# Patient Record
Sex: MALE | Race: BLACK OR AFRICAN AMERICAN
[De-identification: names, ages, dates, MRNs, and addresses within clinical notes are randomized per-mention and may not be internally consistent; named-entity substitution may affect disease eponyms.]

---
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
## 2021-04-15 ENCOUNTER — HOSPITAL ENCOUNTER (EMERGENCY)
Dept: HOSPITAL 47 - EC | Age: 21
Discharge: HOME | End: 2021-04-15
Payer: COMMERCIAL

## 2021-04-15 VITALS
SYSTOLIC BLOOD PRESSURE: 134 MMHG | RESPIRATION RATE: 18 BRPM | TEMPERATURE: 98 F | HEART RATE: 55 BPM | DIASTOLIC BLOOD PRESSURE: 83 MMHG

## 2021-04-15 DIAGNOSIS — G47.00: Primary | ICD-10-CM

## 2021-04-15 DIAGNOSIS — Z79.899: ICD-10-CM

## 2021-04-15 DIAGNOSIS — F41.9: ICD-10-CM

## 2021-04-15 DIAGNOSIS — R07.9: ICD-10-CM

## 2021-04-15 DIAGNOSIS — J45.909: ICD-10-CM

## 2021-04-15 LAB
ANION GAP SERPL CALC-SCNC: 9 MMOL/L
BASOPHILS # BLD AUTO: 0 K/UL (ref 0–0.2)
BASOPHILS NFR BLD AUTO: 1 %
BUN SERPL-SCNC: 12 MG/DL (ref 9–20)
CALCIUM SPEC-MCNC: 10.2 MG/DL (ref 8.4–10.2)
CHLORIDE SERPL-SCNC: 102 MMOL/L (ref 98–107)
CO2 SERPL-SCNC: 25 MMOL/L (ref 22–30)
EOSINOPHIL # BLD AUTO: 0 K/UL (ref 0–0.7)
EOSINOPHIL NFR BLD AUTO: 1 %
ERYTHROCYTE [DISTWIDTH] IN BLOOD BY AUTOMATED COUNT: 5.15 M/UL (ref 4.3–5.9)
ERYTHROCYTE [DISTWIDTH] IN BLOOD: 12.8 % (ref 11.5–15.5)
GLUCOSE SERPL-MCNC: 99 MG/DL (ref 74–99)
HCT VFR BLD AUTO: 46.3 % (ref 39–53)
HGB BLD-MCNC: 15.6 GM/DL (ref 13–17.5)
LIPASE SERPL-CCNC: 73 U/L (ref 23–300)
LYMPHOCYTES # SPEC AUTO: 1.5 K/UL (ref 1–4.8)
LYMPHOCYTES NFR SPEC AUTO: 20 %
MCH RBC QN AUTO: 30.2 PG (ref 25–35)
MCHC RBC AUTO-ENTMCNC: 33.7 G/DL (ref 31–37)
MCV RBC AUTO: 89.8 FL (ref 80–100)
MONOCYTES # BLD AUTO: 0.4 K/UL (ref 0–1)
MONOCYTES NFR BLD AUTO: 5 %
NEUTROPHILS # BLD AUTO: 5.5 K/UL (ref 1.3–7.7)
NEUTROPHILS NFR BLD AUTO: 73 %
PLATELET # BLD AUTO: 295 K/UL (ref 150–450)
POTASSIUM SERPL-SCNC: 4.2 MMOL/L (ref 3.5–5.1)
SODIUM SERPL-SCNC: 136 MMOL/L (ref 137–145)
WBC # BLD AUTO: 7.6 K/UL (ref 4–11)

## 2021-04-15 PROCEDURE — 84484 ASSAY OF TROPONIN QUANT: CPT

## 2021-04-15 PROCEDURE — 83690 ASSAY OF LIPASE: CPT

## 2021-04-15 PROCEDURE — 71045 X-RAY EXAM CHEST 1 VIEW: CPT

## 2021-04-15 PROCEDURE — 83880 ASSAY OF NATRIURETIC PEPTIDE: CPT

## 2021-04-15 PROCEDURE — 36415 COLL VENOUS BLD VENIPUNCTURE: CPT

## 2021-04-15 PROCEDURE — 99285 EMERGENCY DEPT VISIT HI MDM: CPT

## 2021-04-15 PROCEDURE — 80048 BASIC METABOLIC PNL TOTAL CA: CPT

## 2021-04-15 PROCEDURE — 93005 ELECTROCARDIOGRAM TRACING: CPT

## 2021-04-15 PROCEDURE — 85025 COMPLETE CBC W/AUTO DIFF WBC: CPT

## 2021-04-15 NOTE — XR
EXAMINATION: XR chest 1V portable

DATE AND TIME:  4/15/2021 5:48 PM

 

CLINICAL INDICATION: PHH; pain, nausea

 

TECHNIQUE: Single frontal view

 

COMPARISON: 2/20/1715

 

FINDINGS: 

The lungs are hyperinflated. The lungs are clear and well-expanded bilaterally.

 

The pleural spaces are negative.

 

The cardiac silhouette is not enlarged. 

 

The remainder of the mediastinal silhouette is unremarkable. 

 

The skeletal structures and soft tissues are negative for acute findings.

 

IMPRESSION:

Hyperinflated lungs noted; no other findings.

## 2021-04-15 NOTE — ED
General Adult HPI





- General


Chief complaint: Recheck/Abnormal Lab/Rx


Stated complaint: unable to sleep, nausea


Time Seen by Provider: 04/15/21 16:36


Source: patient


Mode of arrival: ambulatory


Limitations: no limitations





- History of Present Illness


Initial comments: 


Dictation was produced using dragon dictation software. please excuse any 

grammatical, word or spelling errors. 





This patient was cared for during a federal and state declared state of 

emergency secondary to Covid 19





Chief Complaint: 20-year-old -American male presents with chief complaint

of insomnia





History of Present Illness: Is a 20-year-old male who presents emergency 

department for chief complaint of insomnia.  Patient states he is here today 

because he is having difficulty sleeping.  States that he is for the last 6 days

has not had any good sleep.  Did see his primary care physician told to take 

antihistamines.  States that at work however not worked very well according to 

him.  Patient also has some mild chest pain.  He states he knows is more when he

goes to bed.  He denies any history of anxiety but does feel anxious about his 

symptoms.  He does feel short of breath when he lies flat.  Denies any edema is 

lower extremities.  States that he has a mild pressure-like sensation to his 

substernal area.  No associated diaphoresis or radiation of symptoms to the jaw 

or upper extremities.  No associated nausea.  He also tried melatonin with no 

effect.








The ROS documented in this emergency department record has been reviewed and 

confirmed by me.  Those systems with pertinent positive or negative responses 

have been documented in the HPI.  All other systems are other negative and/or 

noncontributory.








PHYSICAL EXAM:


General Impression: Alert and oriented x3, not in acute distress


HEENT: Normocephalic atraumatic, extra-ocular movements intact, pupils equal and

reactive to light bilaterally, mucous membranes moist.


Cardiovascular: Heart regular rate and rhythm


Chest: Able to complete full sentences, no retractions, no tachypnea


Abdomen: abdomen soft, non-tender, non-distended, no organomegaly


Musculoskeletal: Pulses present and equal in all extremities, no peripheral 

edema


Motor:  no focal deficits noted


Neurological: CN II-XII grossly intact, no focal motor or sensory deficits noted


Skin: Intact with no visualized rashes


Psych: Normal affect and mood





ED course: 20-year-old male presents with chest pain and insomnia.  Vital Signs 

upon arrival are within acceptable limits.  Physical examination is benign





Physical examination is benign.  EKGs benign no findings to suggest pericarditis

or any other acute process.  Laboratory evaluation obtained.  CBC, metabolic 

panel is unremarkable.  Brain natruretic peptide troponins negative.  Chest x-

ray is nonacute.  Patient observed in the emergency department for approximately

3 hours.  He is reevaluated at 7:00 PM in stable medical condition.  Patient 

given some benzodiazepines to aid with sleeping.  He is told to follow-up with 

his primary care physician for outpatient management of insomnia.





EKG interpretation: Ventricular rate 56, sinus bradycardia,.  134, , QTc 

416. No NM prolongation, no QTC prolongation, no ST or T-wave changes noted.  . 

Overall, this EKG is unremarkable











- Related Data


                                Home Medications











 Medication  Instructions  Recorded  Confirmed


 


Albuterol Inhaler (Mhu) [Ventolin 1 - 2 puff INHALATION RT-Q6H PRN 02/04/17 02/04/17





Hfa Inhaler]   








                                  Previous Rx's











 Medication  Instructions  Recorded


 


Ondansetron Odt [Zofran Odt] 4 mg PO Q8HR PRN #12 tab 02/12/17


 


ALPRAZolam [Xanax] 0.25 mg PO HS PRN 3 Days #3 tab 04/15/21











                                    Allergies











Allergy/AdvReac Type Severity Reaction Status Date / Time


 


No Known Allergies Allergy   Verified 02/12/17 22:45














Review of Systems


ROS Statement: 


Those systems with pertinent positive or pertinent negative responses have been 

documented in the HPI.





ROS Other: All systems not noted in ROS Statement are negative.





Past Medical History


Past Medical History: Asthma


History of Any Multi-Drug Resistant Organisms: None Reported


Past Surgical History: Ear Surgery


Past Psychological History: Anxiety


Smoking Status: Never smoker


Past Alcohol Use History: None Reported


Past Drug Use History: None Reported





General Exam


Limitations: no limitations





Course


                                   Vital Signs











  04/15/21





  16:31


 


Temperature 98.0 F


 


Pulse Rate 55 L


 


Respiratory 18





Rate 


 


Blood Pressure 134/83


 


O2 Sat by Pulse 100





Oximetry 














Medical Decision Making





- Lab Data


Result diagrams: 


                                 04/15/21 17:25





                                 04/15/21 17:25


                                   Lab Results











  04/15/21 04/15/21 04/15/21 Range/Units





  17:25 17:25 17:25 


 


WBC  7.6    (4.0-11.0)  k/uL


 


RBC  5.15    (4.30-5.90)  m/uL


 


Hgb  15.6    (13.0-17.5)  gm/dL


 


Hct  46.3    (39.0-53.0)  %


 


MCV  89.8    (80.0-100.0)  fL


 


MCH  30.2    (25.0-35.0)  pg


 


MCHC  33.7    (31.0-37.0)  g/dL


 


RDW  12.8    (11.5-15.5)  %


 


Plt Count  295    (150-450)  k/uL


 


MPV  7.1    


 


Neutrophils %  73    %


 


Lymphocytes %  20    %


 


Monocytes %  5    %


 


Eosinophils %  1    %


 


Basophils %  1    %


 


Neutrophils #  5.5    (1.3-7.7)  k/uL


 


Lymphocytes #  1.5    (1.0-4.8)  k/uL


 


Monocytes #  0.4    (0-1.0)  k/uL


 


Eosinophils #  0.0    (0-0.7)  k/uL


 


Basophils #  0.0    (0-0.2)  k/uL


 


Sodium   136 L   (137-145)  mmol/L


 


Potassium   4.2   (3.5-5.1)  mmol/L


 


Chloride   102   ()  mmol/L


 


Carbon Dioxide   25   (22-30)  mmol/L


 


Anion Gap   9   mmol/L


 


BUN   12   (9-20)  mg/dL


 


Creatinine   0.95   (0.66-1.25)  mg/dL


 


Est GFR (CKD-EPI)AfAm   >90   (>60 ml/min/1.73 sqM)  


 


Est GFR (CKD-EPI)NonAf   >90   (>60 ml/min/1.73 sqM)  


 


Glucose   99   (74-99)  mg/dL


 


Calcium   10.2   (8.4-10.2)  mg/dL


 


Troponin I    <0.012  (0.000-0.034)  ng/mL


 


NT-Pro-B Natriuret Pep     pg/mL


 


Lipase   73   ()  U/L














  04/15/21 Range/Units





  17:25 


 


WBC   (4.0-11.0)  k/uL


 


RBC   (4.30-5.90)  m/uL


 


Hgb   (13.0-17.5)  gm/dL


 


Hct   (39.0-53.0)  %


 


MCV   (80.0-100.0)  fL


 


MCH   (25.0-35.0)  pg


 


MCHC   (31.0-37.0)  g/dL


 


RDW   (11.5-15.5)  %


 


Plt Count   (150-450)  k/uL


 


MPV   


 


Neutrophils %   %


 


Lymphocytes %   %


 


Monocytes %   %


 


Eosinophils %   %


 


Basophils %   %


 


Neutrophils #   (1.3-7.7)  k/uL


 


Lymphocytes #   (1.0-4.8)  k/uL


 


Monocytes #   (0-1.0)  k/uL


 


Eosinophils #   (0-0.7)  k/uL


 


Basophils #   (0-0.2)  k/uL


 


Sodium   (137-145)  mmol/L


 


Potassium   (3.5-5.1)  mmol/L


 


Chloride   ()  mmol/L


 


Carbon Dioxide   (22-30)  mmol/L


 


Anion Gap   mmol/L


 


BUN   (9-20)  mg/dL


 


Creatinine   (0.66-1.25)  mg/dL


 


Est GFR (CKD-EPI)AfAm   (>60 ml/min/1.73 sqM)  


 


Est GFR (CKD-EPI)NonAf   (>60 ml/min/1.73 sqM)  


 


Glucose   (74-99)  mg/dL


 


Calcium   (8.4-10.2)  mg/dL


 


Troponin I   (0.000-0.034)  ng/mL


 


NT-Pro-B Natriuret Pep  18  pg/mL


 


Lipase   ()  U/L














Disposition


Clinical Impression: 


 Insomnia, Chest pain





Disposition: HOME SELF-CARE


Condition: Good


Instructions (If sedation given, give patient instructions):  Insomnia (ED)


Prescriptions: 


ALPRAZolam [Xanax] 0.25 mg PO HS PRN 3 Days #3 tab


 PRN Reason: Insomnia


Is patient prescribed a controlled substance at d/c from ED?: Yes


If prescribed controlled substance>3 days was MAPS reviewed?: Prescribed <3 Days


Referrals: 


Valerie eKn MD [Primary Care Provider] - 1-2 days


Time of Disposition: 19:11

## 2021-05-13 ENCOUNTER — HOSPITAL ENCOUNTER (OUTPATIENT)
Dept: HOSPITAL 47 - SLEEP | Age: 21
End: 2021-05-13
Attending: INTERNAL MEDICINE
Payer: COMMERCIAL

## 2021-05-13 DIAGNOSIS — G47.8: ICD-10-CM

## 2021-05-13 DIAGNOSIS — F51.04: Primary | ICD-10-CM

## 2021-05-13 PROCEDURE — 99211 OFF/OP EST MAY X REQ PHY/QHP: CPT

## 2021-05-13 NOTE — CONS
CONSULTATION



DATE OF SERVICE:

05/13/2021



20-year-old gentleman have [facility] in [facility] for difficulties to fall

asleep and multiple awakenings from sleep, according to his mother.



HISTORY OF PRESENT ILLNESS/SLEEP WAKE EVALUATION:

Most of the problem started about one month ago.  After patient changed his schedule,

he started to work in early morning and he started to have difficulties with initiating

sleep.  He is afraid that he will not sleep enough.



SLEEP SCHEDULE:

He goes to bed usually around 11:00 pm but falling asleep only around 3 a.m. and he

gets up in the morning around 8 a.m.

On weekends, he goes to bed about the same, but gets up late about 8, 9 or 10 am.

Previously before he started to work, he went to bed later around midnight and gets up

around 9 or 10 am before.



DURING SLEEP:

He does have TV set in bedroom.  He sleeps on the side and stomach position.  No

snoring according to his mother, but patient still wakes up from sleep about 2-3 times.

Sometimes possibly has leg movements at night.



DURING THE DAY/SLEEP WAKE EVALUATION:

During the day, has episodes of anxiety, worry about his sleep.  [place] Sleepiness

Scale is 6.  He does not take any naps.



PAST MEDICAL HISTORY:

Basically negative.



PAST SURGICAL HISTORY:

Negative.



MEDICATIONS:

None.



SOCIAL HISTORY:

Negative for smoking or using alcohol.



FAMILY HISTORY:

Positive for asthma and headaches.



REVIEW OF SYSTEMS:

Difficulties to initiate sleep, multiple awakenings from sleep.



PHYSICAL EXAMINATION:

GENERAL:  gentleman without distress.

/89, HR 62, RR 12, height 5 feet 7 inches, weight 143.0 pounds, body mass index

22, temperature 97.1.  Oxygen saturation at room air 99%.

HEENT: PERRLA, EOMI. Oropharynx extremely low position of soft palate.  Mallampati 3-4.

NECK:  14.5 inches in circumference. Supple, no JVD.  Thyroid is not palpable.

LUNGS:  Clear to percussion and to auscultation.  Good air exchange.  No wheezing or

rhonchi.

HEART:  S1, S2 regular.  No murmurs, gallops, or rubs.

ABDOMEN:  Soft and nontender.  Bowel sounds are present.  No organomegaly appreciated.

EXTREMITIES: No clubbing or cyanosis.

CNS:  Awake, alert, and oriented X3.  Cranial nerves 2 to 7 intact.  There is no

fasciculation or atrophy. noted.  No focal deficits observed.



IMPRESSION:

1. Psychophysiological insomnia, possibly secondary to anxiety.

2. Possible sleep delay syndrome.

3. Multiple awakenings from sleep.  Low position of soft palate.  Rule out obstructive

    sleep apnea-hypopnea syndrome.

4. Rule out periodic limb movements as the reason for awakenings from sleep.



PLAN:

1. I discussed with the patient and the family psychological techniques for treatment

    of insomnia, including stimulus control, paradoxical intention worry of time, no

    watching clock.

2. Bright light in the morning to help the patient to move his sleep cycle earlier.

3. Ambien 5 mg at bedtime.

4. No driving if feeling sleepiness.

5. Polysomnography for evaluation of patient breathing during sleep and to check for

    periodic limb movements.

6. Following plan after reviewing the sleep test.

Thank you very much for referring this patient for consultation.



Sincerely,







Raul Friedman MD, PhD, FAASM

Diplomat of [employer]

[employer]

Medical Director of [facility]





MMODL / CHUYN: [id number] / Job#: [id number]